# Patient Record
Sex: MALE | Race: WHITE
[De-identification: names, ages, dates, MRNs, and addresses within clinical notes are randomized per-mention and may not be internally consistent; named-entity substitution may affect disease eponyms.]

---

## 2017-06-14 ENCOUNTER — HOSPITAL ENCOUNTER (EMERGENCY)
Dept: HOSPITAL 80 - FED | Age: 67
Discharge: HOME | End: 2017-06-14
Payer: COMMERCIAL

## 2017-06-14 VITALS
RESPIRATION RATE: 18 BRPM | DIASTOLIC BLOOD PRESSURE: 74 MMHG | OXYGEN SATURATION: 94 % | HEART RATE: 86 BPM | SYSTOLIC BLOOD PRESSURE: 118 MMHG

## 2017-06-14 VITALS — TEMPERATURE: 97.9 F

## 2017-06-14 DIAGNOSIS — M54.5: Primary | ICD-10-CM

## 2017-06-14 DIAGNOSIS — F17.200: ICD-10-CM
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
## 2018-03-09 ENCOUNTER — HOSPITAL ENCOUNTER (OUTPATIENT)
Dept: HOSPITAL 80 - BMCIMAGING | Age: 68
End: 2018-03-09
Attending: INTERNAL MEDICINE
Payer: COMMERCIAL

## 2018-03-09 DIAGNOSIS — J15.9: Primary | ICD-10-CM

## 2018-03-09 DIAGNOSIS — J06.9: ICD-10-CM
